# Patient Record
(demographics unavailable — no encounter records)

---

## 2025-02-07 NOTE — PHYSICAL EXAM
[Chaperone Present] : A chaperone was present in the examining room during all aspects of the physical examination [Oriented x3] : oriented x3 [Examination Of The Breasts] : a normal appearance [No Masses] : no breast masses were palpable [Labia Majora] : normal [Labia Minora] : normal [Normal] : normal [Uterine Adnexae] : normal [Enlarged ___ wks] : enlarged [unfilled] ~Uweeks [FreeTextEntry2] :  Appropriately responsive, alert, and no acute distress. [FreeTextEntry3] :  Thyroid is non-enlarged, nontender. No palpable nodules or goiter. No lymphadenopathy. [FreeTextEntry7] :  Soft. Nondistended. Nontender. No rebound or guarding. There are no palpable masses. [Vulvar Atrophy] : vulvar atrophy [Atrophy] : atrophy [FreeTextEntry1] :  External genitalia are within normal limits with no lesions visualized. [FreeTextEntry4] :  No vaginal discharge, blood or any lesions noted within the vaginal vault. [FreeTextEntry5] :  A speculum was inserted without any difficulty. The cervix was normal in appearance.  Atrophic and somewhat stenotic.  Pap smear obtained. No cervical motion tenderness. [FreeTextEntry6] : Bimanual examination was notable for an approximately 10-12 weeks size multi-fibroid uterus. There were no palpable adnexal masses or adnexal tenderness. [FreeTextEntry9] :  No lesions. No palpable masses.

## 2025-02-07 NOTE — PLAN
[FreeTextEntry1] : Wellness exam. Normal physical examination. Recommendations: Mammography, bone density scan, ophthalmological, dental, and dermatological examinations.   Status post bone density screening in 2023. Status post colonoscopy March 2024; WNL.  Will continue using [employer] for hot flashes. Prescription sent.  Fibroid uterus. Transvaginal ultrasound ordered.   Discussed proper nutrition and physical exercise. Reviewed age-appropriate vaccinations.

## 2025-02-07 NOTE — HISTORY OF PRESENT ILLNESS
[Patient reported mammogram was normal] : Patient reported mammogram was normal [Patient reported PAP Smear was normal] : Patient reported PAP Smear was normal [Patient reported bone density results were normal] : Patient reported bone density results were normal [LMP unknown] : LMP unknown [postmenopausal] : postmenopausal [Y] : Patient is sexually active [N] : Patient denies prior pregnancies [unknown] : Patient is unsure of the date of her LMP [Post-Menopause, No Sxs] : post-menopausal, currently without symptoms [Menopause Age: ____] : age at menopause was [unfilled] [No] : Patient does not have concerns regarding sex [Currently Active] : currently active [Men] : men [TextBox_4] : 62 -year old  0 postmenopausal presents for an annual examination. Review of systems are negative. [Mammogramdate] : 03/2024 [PapSmeardate] : 02/2024 [BoneDensityDate] : 03/2023 [ColonoscopyDate] : 03/2024 [PGHxTotal] : 0